# Patient Record
Sex: FEMALE | Race: WHITE | NOT HISPANIC OR LATINO | Employment: UNEMPLOYED | URBAN - METROPOLITAN AREA
[De-identification: names, ages, dates, MRNs, and addresses within clinical notes are randomized per-mention and may not be internally consistent; named-entity substitution may affect disease eponyms.]

---

## 2022-07-06 PROBLEM — N92.6 IRREGULAR MENSES: Status: ACTIVE | Noted: 2022-07-06

## 2022-07-07 DIAGNOSIS — Z76.89 ESTABLISHING CARE WITH NEW DOCTOR, ENCOUNTER FOR: Primary | ICD-10-CM

## 2022-07-07 DIAGNOSIS — I10 HYPERTENSION, UNSPECIFIED TYPE: ICD-10-CM

## 2022-07-08 ENCOUNTER — HOSPITAL ENCOUNTER (OUTPATIENT)
Dept: CARDIOLOGY | Facility: HOSPITAL | Age: 47
Discharge: HOME | End: 2022-07-08
Attending: STUDENT IN AN ORGANIZED HEALTH CARE EDUCATION/TRAINING PROGRAM
Payer: MEDICARE

## 2022-07-08 ENCOUNTER — OFFICE VISIT (OUTPATIENT)
Dept: CARDIOLOGY | Facility: CLINIC | Age: 47
End: 2022-07-08
Payer: MEDICARE

## 2022-07-08 VITALS
WEIGHT: 122.38 LBS | BODY MASS INDEX: 21.67 KG/M2 | OXYGEN SATURATION: 98 % | HEART RATE: 54 BPM | DIASTOLIC BLOOD PRESSURE: 70 MMHG | SYSTOLIC BLOOD PRESSURE: 122 MMHG

## 2022-07-08 DIAGNOSIS — F32.A ANXIETY AND DEPRESSION: Primary | ICD-10-CM

## 2022-07-08 DIAGNOSIS — E87.6 HYPOKALEMIA: ICD-10-CM

## 2022-07-08 DIAGNOSIS — F41.9 ANXIETY AND DEPRESSION: Primary | ICD-10-CM

## 2022-07-08 DIAGNOSIS — I10 HYPERTENSION, UNSPECIFIED TYPE: ICD-10-CM

## 2022-07-08 DIAGNOSIS — Z72.0 TOBACCO ABUSE: ICD-10-CM

## 2022-07-08 DIAGNOSIS — R06.02 SOB (SHORTNESS OF BREATH): ICD-10-CM

## 2022-07-08 DIAGNOSIS — R01.1 CARDIAC MURMUR: ICD-10-CM

## 2022-07-08 DIAGNOSIS — Z76.89 ESTABLISHING CARE WITH NEW DOCTOR, ENCOUNTER FOR: ICD-10-CM

## 2022-07-08 PROCEDURE — 99204 OFFICE O/P NEW MOD 45 MIN: CPT | Mod: S$PBB,,, | Performed by: STUDENT IN AN ORGANIZED HEALTH CARE EDUCATION/TRAINING PROGRAM

## 2022-07-08 PROCEDURE — 93005 ELECTROCARDIOGRAM TRACING: CPT | Mod: PO

## 2022-07-08 PROCEDURE — 93010 ELECTROCARDIOGRAM REPORT: CPT | Mod: ,,, | Performed by: INTERNAL MEDICINE

## 2022-07-08 PROCEDURE — 93010 EKG 12-LEAD: ICD-10-PCS | Mod: ,,, | Performed by: INTERNAL MEDICINE

## 2022-07-08 PROCEDURE — 99999 PR PBB SHADOW E&M-EST. PATIENT-LVL III: CPT | Mod: PBBFAC,,, | Performed by: STUDENT IN AN ORGANIZED HEALTH CARE EDUCATION/TRAINING PROGRAM

## 2022-07-08 PROCEDURE — 99213 OFFICE O/P EST LOW 20 MIN: CPT | Mod: PBBFAC,PO | Performed by: STUDENT IN AN ORGANIZED HEALTH CARE EDUCATION/TRAINING PROGRAM

## 2022-07-08 PROCEDURE — 99204 PR OFFICE/OUTPT VISIT, NEW, LEVL IV, 45-59 MIN: ICD-10-PCS | Mod: S$PBB,,, | Performed by: STUDENT IN AN ORGANIZED HEALTH CARE EDUCATION/TRAINING PROGRAM

## 2022-07-08 PROCEDURE — 99999 PR PBB SHADOW E&M-EST. PATIENT-LVL III: ICD-10-PCS | Mod: PBBFAC,,, | Performed by: STUDENT IN AN ORGANIZED HEALTH CARE EDUCATION/TRAINING PROGRAM

## 2022-07-08 NOTE — PROGRESS NOTES
Section of Cardiology                  Cardiac Clinic Note    Chief Complaint/Reason for consultation:  Hypertension      HPI:   Virginia Hart is a 46 y.o. female with h/o HTN, depression/anxiety who comes in as a referral to cardiology clinic by Dr. Feliciano for evaluation.    2022  Reports high blood pressure at least for the last 4 months   Just started seeing a regular doctor (was seeing someone who was prescribing her xanax)  Checks BP readings at home   Reports SOB, may be associated with anxiety per patient, throughout the day, just started about 3-4 months ago  Reports 1 episode of dizziness, while sitting, was on her menstrual cycle  LE swelling, chronic, goes away on it's own  Exercises with  5 days a week, for 1 hour  Tries to eat healthy, watches salt intake   Reports HR runs low regularly   Smokes <1 ppd, smoked for > 20 years, ETOH occ    Family history: no cardiac issues, mom- CVA,  in    Denies chest pain, PND, orthopnea, syncope   Denies DM, CVA      EKG 2022 SB, no acute ST - T wave changes, qtc 450 ms     ECHO      STRESS TEST    [facility]      ROS: All 10 systems reviewed. Please refer to the HPI for pertinent positives. All other systems negative.     Past Medical History  Past Medical History:   Diagnosis Date    Hypertension        Surgical History  Past Surgical History:   Procedure Laterality Date    breast implants      FOOT SURGERY      NECK SURGERY            Allergies:   Review of patient's allergies indicates:  No Known Allergies    Social History:  Social History     Socioeconomic History    Marital status:    Tobacco Use    Smoking status: Current Every Day Smoker     Types: Cigarettes    Smokeless tobacco: Never Used   Substance and Sexual Activity    Alcohol use: Yes    Drug use: Never    Sexual activity: Yes       Family History:  family history includes Cancer in her maternal grandmother and mother.    Home Medications:  Current  Outpatient Medications on File Prior to Visit   Medication Sig Dispense Refill    ALPRAZolam (XANAX) 2 MG Tab TAKE 3/4 TABLET BY MOUTH EVERY MORNING, 3/4 TABLET BY MOUTH EVERY EVENING and 1 TABLET EVERY NIGHT AT BEDTIME AS NEEDED      nitrofurantoin, macrocrystal-monohydrate, (MACROBID) 100 MG capsule Take 1 capsule (100 mg total) by mouth 2 (two) times daily. for 7 days 14 capsule 0    ergocalciferol (ERGOCALCIFEROL) 50,000 unit Cap Take 50,000 Units by mouth every 7 days.       No current facility-administered medications on file prior to visit.       Physical exam:  /70 (BP Location: Left arm, Patient Position: Sitting, BP Method: Medium (Manual))   Pulse (!) 54   Wt 55.5 kg (122 lb 5.7 oz)   LMP 06/22/2022   SpO2 98%   BMI 21.67 kg/m²         General: Pt is a 46 y.o. year old female who is AAOx3, in NAD, is pleasant, well nourished, looks stated age  HEENT: PERRL, EOMI, Oral mucosa pink & moist  CVS  No abnormal cardiac pulsations noted on inspection. JVP not raised. The apical impulse is normal on palpation, and is located in the left 5th intercostal space in the mid - clavicular line. No palpable thrills or abnormal pulsations noted. RR, S1 - S2 heard, 2/6 systolic murmur at LSB, rubs or gallops appreciated.   PUL : CTA B/L. No wheezes/crackles heard   ABD : BS +, soft. No tenderness elicited   LE : No C/C/E. Distal Pulses palpable B/L         LABS:    Chemistry:   Lab Results   Component Value Date     07/06/2022    K 3.4 (A) 07/06/2022     07/06/2022    CO2 29 07/06/2022    BUN 10 07/06/2022    CREATININE 0.70 07/06/2022    CALCIUM 9.5 07/06/2022     Cardiac Markers: No results found for: CKTOTAL, CKMB, CKMBINDEX, TROPONINI  Cardiac Markers (Last 3): No results found for: CKTOTAL, CKMB, CKMBINDEX, TROPONINI  CBC:   Lab Results   Component Value Date    WBC 8.30 07/06/2022    HGB 14.3 07/06/2022    HCT 43.9 07/06/2022    MCV 91.8 07/06/2022     07/06/2022     Lipids: No  results found for: CHOL, TRIG, HDL, LDLDIRECT  Coagulation: No results found for: PT, INR, APTT        Assessment      1. Anxiety and depression    2. Hypertension, unspecified type    3. Hypokalemia    4. Tobacco abuse    5. SOB (shortness of breath)         Plan:    Shortness of breath   Will obtain echo    Hypertension  Not currently on medications     Hypokalemia  Low most recent labs  Increase potassium intake    Tobacco abuse  Will refer to smoking cessation program     Depression/anxiety  Stable  Currently on xanax         This note was prepared using voice recognition system and is likely to have sound alike errors that may have been overlooked even after proofreading.     I have reviewed all pertinent chart information.  Plans and recommendations have been formulated under my direct supervision. All questions answered and patient voiced understanding.   If symptoms persist go to the ED.    RTC in 2 weeks         Evie Marcelo MD  Cardiology

## 2022-07-19 ENCOUNTER — HOSPITAL ENCOUNTER (OUTPATIENT)
Dept: CARDIOLOGY | Facility: HOSPITAL | Age: 47
Discharge: HOME | End: 2022-07-19
Attending: STUDENT IN AN ORGANIZED HEALTH CARE EDUCATION/TRAINING PROGRAM
Payer: MEDICARE

## 2022-07-19 VITALS
HEIGHT: 63 IN | WEIGHT: 122 LBS | SYSTOLIC BLOOD PRESSURE: 122 MMHG | DIASTOLIC BLOOD PRESSURE: 70 MMHG | BODY MASS INDEX: 21.62 KG/M2

## 2022-07-19 DIAGNOSIS — E87.6 HYPOKALEMIA: ICD-10-CM

## 2022-07-19 DIAGNOSIS — R06.02 SOB (SHORTNESS OF BREATH): ICD-10-CM

## 2022-07-19 DIAGNOSIS — F41.9 ANXIETY AND DEPRESSION: ICD-10-CM

## 2022-07-19 DIAGNOSIS — F32.A ANXIETY AND DEPRESSION: ICD-10-CM

## 2022-07-19 DIAGNOSIS — I10 HYPERTENSION, UNSPECIFIED TYPE: ICD-10-CM

## 2022-07-19 LAB
AORTIC ROOT ANNULUS: 2.67 CM
ASCENDING AORTA: 3.08 CM
AV INDEX (PROSTH): 0.69
AV MEAN GRADIENT: 11 MMHG
AV PEAK GRADIENT: 18 MMHG
AV REGURGITATION PRESSURE HALF TIME: 1421.35 MS
AV VALVE AREA: 2.18 CM2
AV VELOCITY RATIO: 0.62
BSA FOR ECHO PROCEDURE: 1.57 M2
CV ECHO LV RWT: 0.32 CM
DOP CALC AO PEAK VEL: 2.11 M/S
DOP CALC AO VTI: 54 CM
DOP CALC LVOT AREA: 3.1 CM2
DOP CALC LVOT DIAMETER: 2 CM
DOP CALC LVOT PEAK VEL: 1.31 M/S
DOP CALC LVOT STROKE VOLUME: 117.75 CM3
DOP CALC RVOT PEAK VEL: 0.98 M/S
DOP CALC RVOT VTI: 18.6 CM
DOP CALCLVOT PEAK VEL VTI: 37.5 CM
E WAVE DECELERATION TIME: 265.48 MSEC
E/A RATIO: 1.23
E/E' RATIO: 8.87 M/S
ECHO LV POSTERIOR WALL: 0.7 CM (ref 0.6–1.1)
EJECTION FRACTION: 65 %
FRACTIONAL SHORTENING: 30 % (ref 28–44)
INTERVENTRICULAR SEPTUM: 1.16 CM (ref 0.6–1.1)
IVRT: 82.78 MSEC
LA MAJOR: 5.85 CM
LA MINOR: 5.17 CM
LA WIDTH: 3.6 CM
LEFT ATRIUM SIZE: 3.29 CM
LEFT ATRIUM VOLUME INDEX MOD: 28.2 ML/M2
LEFT ATRIUM VOLUME INDEX: 35.2 ML/M2
LEFT ATRIUM VOLUME MOD: 44.3 CM3
LEFT ATRIUM VOLUME: 55.26 CM3
LEFT INTERNAL DIMENSION IN SYSTOLE: 3.11 CM (ref 2.1–4)
LEFT VENTRICLE DIASTOLIC VOLUME INDEX: 56.32 ML/M2
LEFT VENTRICLE DIASTOLIC VOLUME: 88.42 ML
LEFT VENTRICLE MASS INDEX: 86 G/M2
LEFT VENTRICLE SYSTOLIC VOLUME INDEX: 24.4 ML/M2
LEFT VENTRICLE SYSTOLIC VOLUME: 38.32 ML
LEFT VENTRICULAR INTERNAL DIMENSION IN DIASTOLE: 4.42 CM (ref 3.5–6)
LEFT VENTRICULAR MASS: 134.82 G
LV LATERAL E/E' RATIO: 9.27 M/S
LV SEPTAL E/E' RATIO: 8.5 M/S
LVOT MG: 4.03 MMHG
LVOT MV: 0.95 CM/S
MV PEAK A VEL: 0.83 M/S
MV PEAK E VEL: 1.02 M/S
MV STENOSIS PRESSURE HALF TIME: 73.42 MS
MV VALVE AREA P 1/2 METHOD: 3 CM2
PISA AR MAX VEL: 3.42 M/S
PISA TR MAX VEL: 2.19 M/S
PULM VEIN S/D RATIO: 1.57
PV MEAN GRADIENT: 1.99 MMHG
PV MV: 1 M/S
PV PEAK D VEL: 0.45 M/S
PV PEAK S VEL: 0.71 M/S
PV PEAK VELOCITY: 1.36 CM/S
RA MAJOR: 4.14 CM
RA PRESSURE: 3 MMHG
RA WIDTH: 2.74 CM
RIGHT VENTRICULAR END-DIASTOLIC DIMENSION: 2.1 CM
SINUS: 2.52 CM
STJ: 2.77 CM
TDI LATERAL: 0.11 M/S
TDI SEPTAL: 0.12 M/S
TDI: 0.12 M/S
TR MAX PG: 19 MMHG
TRICUSPID ANNULAR PLANE SYSTOLIC EXCURSION: 2.82 CM
TV REST PULMONARY ARTERY PRESSURE: 22 MMHG

## 2022-07-19 PROCEDURE — 93306 ECHO (CUPID ONLY): ICD-10-PCS | Mod: 26,,, | Performed by: STUDENT IN AN ORGANIZED HEALTH CARE EDUCATION/TRAINING PROGRAM

## 2022-07-19 PROCEDURE — 93306 TTE W/DOPPLER COMPLETE: CPT | Mod: 26,,, | Performed by: STUDENT IN AN ORGANIZED HEALTH CARE EDUCATION/TRAINING PROGRAM

## 2022-07-19 PROCEDURE — 93306 TTE W/DOPPLER COMPLETE: CPT | Mod: PO

## 2022-07-22 ENCOUNTER — OFFICE VISIT (OUTPATIENT)
Dept: CARDIOLOGY | Facility: CLINIC | Age: 47
End: 2022-07-22
Payer: MEDICARE

## 2022-07-22 VITALS
BODY MASS INDEX: 21.91 KG/M2 | HEART RATE: 49 BPM | WEIGHT: 123.69 LBS | SYSTOLIC BLOOD PRESSURE: 126 MMHG | DIASTOLIC BLOOD PRESSURE: 82 MMHG | OXYGEN SATURATION: 99 % | HEIGHT: 63 IN

## 2022-07-22 DIAGNOSIS — I10 HYPERTENSION, UNSPECIFIED TYPE: Primary | ICD-10-CM

## 2022-07-22 DIAGNOSIS — E87.6 HYPOKALEMIA: ICD-10-CM

## 2022-07-22 DIAGNOSIS — F41.9 ANXIETY AND DEPRESSION: ICD-10-CM

## 2022-07-22 DIAGNOSIS — R06.02 SOB (SHORTNESS OF BREATH): ICD-10-CM

## 2022-07-22 DIAGNOSIS — Z72.0 TOBACCO ABUSE: ICD-10-CM

## 2022-07-22 DIAGNOSIS — F32.A ANXIETY AND DEPRESSION: ICD-10-CM

## 2022-07-22 DIAGNOSIS — R53.83 FATIGUE, UNSPECIFIED TYPE: ICD-10-CM

## 2022-07-22 PROCEDURE — 99214 OFFICE O/P EST MOD 30 MIN: CPT | Mod: S$PBB,,, | Performed by: STUDENT IN AN ORGANIZED HEALTH CARE EDUCATION/TRAINING PROGRAM

## 2022-07-22 PROCEDURE — 99999 PR PBB SHADOW E&M-EST. PATIENT-LVL III: ICD-10-PCS | Mod: PBBFAC,,, | Performed by: STUDENT IN AN ORGANIZED HEALTH CARE EDUCATION/TRAINING PROGRAM

## 2022-07-22 PROCEDURE — 99213 OFFICE O/P EST LOW 20 MIN: CPT | Mod: PBBFAC,PO | Performed by: STUDENT IN AN ORGANIZED HEALTH CARE EDUCATION/TRAINING PROGRAM

## 2022-07-22 PROCEDURE — 99999 PR PBB SHADOW E&M-EST. PATIENT-LVL III: CPT | Mod: PBBFAC,,, | Performed by: STUDENT IN AN ORGANIZED HEALTH CARE EDUCATION/TRAINING PROGRAM

## 2022-07-22 PROCEDURE — 99214 PR OFFICE/OUTPT VISIT, EST, LEVL IV, 30-39 MIN: ICD-10-PCS | Mod: S$PBB,,, | Performed by: STUDENT IN AN ORGANIZED HEALTH CARE EDUCATION/TRAINING PROGRAM

## 2022-07-22 NOTE — PROGRESS NOTES
Section of Cardiology                  Cardiac Clinic Note    Chief Complaint/Reason for consultation:  Hypertension      HPI:   Virginia Hart is a 46 y.o. female with h/o HTN, depression/anxiety who comes in as a referral to cardiology clinic by Dr. Feliciano for evaluation.    2022  Reports high blood pressure at least for the last 4 months   Just started seeing a regular doctor (was seeing someone who was prescribing her xanax)  Checks BP readings at home   Reports SOB, may be associated with anxiety per patient, throughout the day, just started about 3-4 months ago  Reports 1 episode of dizziness, while sitting, was on her menstrual cycle  LE swelling, chronic, goes away on it's own  Exercises with  5 days a week, for 1 hour  Tries to eat healthy, watches salt intake   Reports HR runs low regularly   Smokes <1 ppd, smoked for > 20 years, ETOH occ    Family history: no cardiac issues, mom- CVA,  in    Denies chest pain, PND, orthopnea, syncope   Denies DM, CVA      22  Reports SOB, stable  Doesn't sleep well  Wakes up in the middle of the night, multiple times, never had sleep study   Feels tired throughout the day, which is unusual for her   Chest pain is infrequent, thought due to anxiety, lasts 1 min    Echo with normal EF     Denies PND, orthopnea, syncope         EKG 2022 SB, no acute ST - T wave changes, qtc 450 ms     ECHO    · Mild left atrial enlargement.  · The estimated PA systolic pressure is 22 mmHg.  · The left ventricle is normal in size with normal systolic function.  · Normal left ventricular diastolic function.  · Normal right ventricular size with normal right ventricular systolic function.  · Normal central venous pressure (3 mmHg).  · Mild aortic regurgitation.  · The estimated ejection fraction is 65%.        STRESS TEST    [facility]      ROS: All 10 systems reviewed. Please refer to the HPI for pertinent positives. All other systems negative.     Past  "Medical History  Past Medical History:   Diagnosis Date    Hypertension        Surgical History  Past Surgical History:   Procedure Laterality Date    breast implants      FOOT SURGERY      NECK SURGERY            Allergies:   Review of patient's allergies indicates:  No Known Allergies    Social History:  Social History     Socioeconomic History    Marital status:    Tobacco Use    Smoking status: Current Every Day Smoker     Types: Cigarettes    Smokeless tobacco: Never Used   Substance and Sexual Activity    Alcohol use: Yes    Drug use: Never    Sexual activity: Yes       Family History:  family history includes Cancer in her maternal grandmother and mother.    Home Medications:  Current Outpatient Medications on File Prior to Visit   Medication Sig Dispense Refill    ALPRAZolam (XANAX) 2 MG Tab TAKE 3/4 TABLET BY MOUTH EVERY MORNING, 3/4 TABLET BY MOUTH EVERY EVENING and 1 TABLET EVERY NIGHT AT BEDTIME AS NEEDED      ergocalciferol (ERGOCALCIFEROL) 50,000 unit Cap Take 50,000 Units by mouth every 7 days.       No current facility-administered medications on file prior to visit.       Physical exam:  /82 (BP Location: Left arm, Patient Position: Sitting, BP Method: Medium (Manual))   Pulse (!) 49   Ht 5' 3" (1.6 m)   Wt 56.1 kg (123 lb 10.9 oz)   LMP 06/22/2022   SpO2 99%   BMI 21.91 kg/m²         General: Pt is a 46 y.o. year old female who is AAOx3, in NAD, is pleasant, well nourished, looks stated age  HEENT: PERRL, EOMI, Oral mucosa pink & moist  CVS  No abnormal cardiac pulsations noted on inspection. JVP not raised. The apical impulse is normal on palpation, and is located in the left 5th intercostal space in the mid - clavicular line. No palpable thrills or abnormal pulsations noted. RR, S1 - S2 heard, 2/6 systolic murmur at LSB, rubs or gallops appreciated.   PUL : CTA B/L. No wheezes/crackles heard   ABD : BS +, soft. No tenderness elicited   LE : No C/C/E. Distal Pulses " palpable B/L         LABS:    Chemistry:   Lab Results   Component Value Date     07/06/2022    K 3.4 (A) 07/06/2022     07/06/2022    CO2 29 07/06/2022    BUN 10 07/06/2022    CREATININE 0.70 07/06/2022    CALCIUM 9.5 07/06/2022     Cardiac Markers: No results found for: CKTOTAL, CKMB, CKMBINDEX, TROPONINI  Cardiac Markers (Last 3): No results found for: CKTOTAL, CKMB, CKMBINDEX, TROPONINI  CBC:   Lab Results   Component Value Date    WBC 8.30 07/06/2022    HGB 14.3 07/06/2022    HCT 43.9 07/06/2022    MCV 91.8 07/06/2022     07/06/2022     Lipids: No results found for: CHOL, TRIG, HDL, LDLDIRECT  Coagulation: No results found for: PT, INR, APTT        Assessment      1. Hypertension, unspecified type    2. Anxiety and depression    3. Hypokalemia    4. SOB (shortness of breath)    5. Fatigue, unspecified type    6. Tobacco abuse         Plan:    Shortness of breath/fatigue  Echo with normal EF  May have COPD, currently smokes   May need sleep study   Refer to Pulmonology     Hypertension  Not currently on medications   Enroll in digital medicine     Hypokalemia  Low most recent labs  Continue to increase potassium intake    Tobacco abuse  Referred smoking cessation program     Depression/anxiety  Stable  Currently on xanax         This note was prepared using voice recognition system and is likely to have sound alike errors that may have been overlooked even after proofreading.     I have reviewed all pertinent chart information.  Plans and recommendations have been formulated under my direct supervision. All questions answered and patient voiced understanding.   If symptoms persist go to the ED.    RTC in 3 months         Evie Marcelo MD  Cardiology

## 2022-07-28 ENCOUNTER — PATIENT MESSAGE (OUTPATIENT)
Dept: ADMINISTRATIVE | Facility: OTHER | Age: 47
End: 2022-07-28
Payer: MEDICAID

## 2022-09-26 DIAGNOSIS — R06.02 SOB (SHORTNESS OF BREATH): Primary | ICD-10-CM

## 2022-10-10 ENCOUNTER — OFFICE VISIT (OUTPATIENT)
Dept: PULMONOLOGY | Facility: CLINIC | Age: 47
End: 2022-10-10
Payer: MEDICARE

## 2022-10-10 ENCOUNTER — HOSPITAL ENCOUNTER (OUTPATIENT)
Dept: RADIOLOGY | Facility: HOSPITAL | Age: 47
Discharge: HOME | End: 2022-10-10
Attending: INTERNAL MEDICINE
Payer: MEDICARE

## 2022-10-10 VITALS
SYSTOLIC BLOOD PRESSURE: 134 MMHG | OXYGEN SATURATION: 99 % | HEART RATE: 61 BPM | BODY MASS INDEX: 21.25 KG/M2 | DIASTOLIC BLOOD PRESSURE: 72 MMHG | HEIGHT: 63 IN | RESPIRATION RATE: 14 BRPM | WEIGHT: 119.94 LBS

## 2022-10-10 DIAGNOSIS — R06.02 SOB (SHORTNESS OF BREATH): Primary | ICD-10-CM

## 2022-10-10 DIAGNOSIS — F17.200 TOBACCO DEPENDENCE: ICD-10-CM

## 2022-10-10 DIAGNOSIS — R53.83 FATIGUE, UNSPECIFIED TYPE: ICD-10-CM

## 2022-10-10 DIAGNOSIS — I10 PRIMARY HYPERTENSION: ICD-10-CM

## 2022-10-10 DIAGNOSIS — R06.02 SOB (SHORTNESS OF BREATH): ICD-10-CM

## 2022-10-10 PROBLEM — F41.9 ANXIETY: Status: ACTIVE | Noted: 2022-10-10

## 2022-10-10 PROCEDURE — 99999 PR PBB SHADOW E&M-EST. PATIENT-LVL V: CPT | Mod: PBBFAC,,, | Performed by: INTERNAL MEDICINE

## 2022-10-10 PROCEDURE — 99205 OFFICE O/P NEW HI 60 MIN: CPT | Mod: S$PBB,,, | Performed by: INTERNAL MEDICINE

## 2022-10-10 PROCEDURE — 71046 X-RAY EXAM CHEST 2 VIEWS: CPT | Mod: TC,FY,PO

## 2022-10-10 PROCEDURE — 99215 OFFICE O/P EST HI 40 MIN: CPT | Mod: PBBFAC,25,PO | Performed by: INTERNAL MEDICINE

## 2022-10-10 PROCEDURE — 71046 XR CHEST PA AND LATERAL: ICD-10-PCS | Mod: 26,,, | Performed by: RADIOLOGY

## 2022-10-10 PROCEDURE — 71046 X-RAY EXAM CHEST 2 VIEWS: CPT | Mod: 26,,, | Performed by: RADIOLOGY

## 2022-10-10 PROCEDURE — 99999 PR PBB SHADOW E&M-EST. PATIENT-LVL V: ICD-10-PCS | Mod: PBBFAC,,, | Performed by: INTERNAL MEDICINE

## 2022-10-10 PROCEDURE — 99205 PR OFFICE/OUTPT VISIT, NEW, LEVL V, 60-74 MIN: ICD-10-PCS | Mod: S$PBB,,, | Performed by: INTERNAL MEDICINE

## 2022-10-10 RX ORDER — TRIAMCINOLONE ACETONIDE 1 MG/G
CREAM TOPICAL 2 TIMES DAILY
COMMUNITY
Start: 2022-08-23

## 2022-10-10 NOTE — ASSESSMENT & PLAN NOTE
Dyspnea multifactorial etiologies   Given smoking history chronic obstructive airway disease is high on the list   Check D-dimer   PFTs  ABG  6 minutes walk
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
## 2022-10-11 ENCOUNTER — OFFICE VISIT (OUTPATIENT)
Dept: CARDIOLOGY | Facility: CLINIC | Age: 47
End: 2022-10-11
Payer: MEDICARE

## 2022-10-11 ENCOUNTER — LAB VISIT (OUTPATIENT)
Dept: LAB | Facility: HOSPITAL | Age: 47
End: 2022-10-11
Attending: STUDENT IN AN ORGANIZED HEALTH CARE EDUCATION/TRAINING PROGRAM
Payer: MEDICARE

## 2022-10-11 VITALS
WEIGHT: 120.13 LBS | HEIGHT: 63 IN | BODY MASS INDEX: 21.29 KG/M2 | HEART RATE: 55 BPM | DIASTOLIC BLOOD PRESSURE: 79 MMHG | SYSTOLIC BLOOD PRESSURE: 133 MMHG | OXYGEN SATURATION: 100 %

## 2022-10-11 DIAGNOSIS — E87.6 HYPOKALEMIA: ICD-10-CM

## 2022-10-11 DIAGNOSIS — I10 PRIMARY HYPERTENSION: ICD-10-CM

## 2022-10-11 DIAGNOSIS — Z72.0 TOBACCO ABUSE: ICD-10-CM

## 2022-10-11 DIAGNOSIS — N92.6 IRREGULAR MENSES: ICD-10-CM

## 2022-10-11 DIAGNOSIS — F32.A ANXIETY AND DEPRESSION: ICD-10-CM

## 2022-10-11 DIAGNOSIS — F41.9 ANXIETY AND DEPRESSION: ICD-10-CM

## 2022-10-11 DIAGNOSIS — R06.02 SOB (SHORTNESS OF BREATH): ICD-10-CM

## 2022-10-11 DIAGNOSIS — R06.02 SOB (SHORTNESS OF BREATH): Primary | ICD-10-CM

## 2022-10-11 PROCEDURE — 36415 COLL VENOUS BLD VENIPUNCTURE: CPT | Mod: PO | Performed by: STUDENT IN AN ORGANIZED HEALTH CARE EDUCATION/TRAINING PROGRAM

## 2022-10-11 PROCEDURE — 99999 PR PBB SHADOW E&M-EST. PATIENT-LVL III: ICD-10-PCS | Mod: PBBFAC,,, | Performed by: STUDENT IN AN ORGANIZED HEALTH CARE EDUCATION/TRAINING PROGRAM

## 2022-10-11 PROCEDURE — 99214 PR OFFICE/OUTPT VISIT, EST, LEVL IV, 30-39 MIN: ICD-10-PCS | Mod: S$PBB,,, | Performed by: STUDENT IN AN ORGANIZED HEALTH CARE EDUCATION/TRAINING PROGRAM

## 2022-10-11 PROCEDURE — 80048 BASIC METABOLIC PNL TOTAL CA: CPT | Performed by: STUDENT IN AN ORGANIZED HEALTH CARE EDUCATION/TRAINING PROGRAM

## 2022-10-11 PROCEDURE — 99213 OFFICE O/P EST LOW 20 MIN: CPT | Mod: PBBFAC,PO | Performed by: STUDENT IN AN ORGANIZED HEALTH CARE EDUCATION/TRAINING PROGRAM

## 2022-10-11 PROCEDURE — 99214 OFFICE O/P EST MOD 30 MIN: CPT | Mod: S$PBB,,, | Performed by: STUDENT IN AN ORGANIZED HEALTH CARE EDUCATION/TRAINING PROGRAM

## 2022-10-11 PROCEDURE — 99999 PR PBB SHADOW E&M-EST. PATIENT-LVL III: CPT | Mod: PBBFAC,,, | Performed by: STUDENT IN AN ORGANIZED HEALTH CARE EDUCATION/TRAINING PROGRAM

## 2022-10-11 RX ORDER — LISINOPRIL 5 MG/1
5 TABLET ORAL DAILY
Qty: 30 TABLET | Refills: 11 | Status: SHIPPED | OUTPATIENT
Start: 2022-10-11 | End: 2023-10-11

## 2022-10-11 NOTE — PROGRESS NOTES
Section of Cardiology                  Cardiac Clinic Note    Chief Complaint/Reason for consultation:  Hypertension      HPI:   Virginia Hart is a 46 y.o. female with h/o HTN, depression/anxiety who comes in as a referral to cardiology clinic by Dr. Feliciano for evaluation.    2022  Reports high blood pressure at least for the last 4 months   Just started seeing a regular doctor (was seeing someone who was prescribing her xanax)  Checks BP readings at home   Reports SOB, may be associated with anxiety per patient, throughout the day, just started about 3-4 months ago  Reports 1 episode of dizziness, while sitting, was on her menstrual cycle  LE swelling, chronic, goes away on it's own  Exercises with  5 days a week, for 1 hour  Tries to eat healthy, watches salt intake   Reports HR runs low regularly   Smokes <1 ppd, smoked for > 20 years, ETOH occ    Family history: no cardiac issues, mom- CVA,  in    Denies chest pain, PND, orthopnea, syncope   Denies DM, CVA      22  Reports SOB, stable  Doesn't sleep well  Wakes up in the middle of the night, multiple times, never had sleep study   Feels tired throughout the day, which is unusual for her   Chest pain is infrequent, thought due to anxiety, lasts 1 min    Echo with normal EF     Denies PND, orthopnea, syncope     10/11/22  Reports being dizzy recently, occurs when she's tired  Digital medicine BP readings are high  Watching salt intake  SOB stable   Denies chest pain          EKG 2022 SB, no acute ST - T wave changes, qtc 450 ms     ECHO    Mild left atrial enlargement.  The estimated PA systolic pressure is 22 mmHg.  The left ventricle is normal in size with normal systolic function.  Normal left ventricular diastolic function.  Normal right ventricular size with normal right ventricular systolic function.  Normal central venous pressure (3 mmHg).  Mild aortic regurgitation.  The estimated ejection fraction is  "65%.        STRESS TEST    [facility]      ROS: All 10 systems reviewed. Please refer to the HPI for pertinent positives. All other systems negative.     Past Medical History  Past Medical History:   Diagnosis Date    Hypertension        Surgical History  Past Surgical History:   Procedure Laterality Date    breast implants      FOOT SURGERY      NECK SURGERY            Allergies:   Review of patient's allergies indicates:  No Known Allergies    Social History:  Social History     Socioeconomic History    Marital status:    Tobacco Use    Smoking status: Every Day     Packs/day: 1.00     Years: 25.00     Pack years: 25.00     Types: Cigarettes     Start date: 1980    Smokeless tobacco: Never   Substance and Sexual Activity    Alcohol use: Yes    Drug use: Never    Sexual activity: Yes       Family History:  family history includes Cancer in her maternal grandmother and mother.    Home Medications:  Current Outpatient Medications on File Prior to Visit   Medication Sig Dispense Refill    ALPRAZolam (XANAX) 2 MG Tab TAKE 3/4 TABLET BY MOUTH EVERY MORNING, 3/4 TABLET BY MOUTH EVERY EVENING and 1 TABLET EVERY NIGHT AT BEDTIME AS NEEDED      triamcinolone acetonide 0.1% (KENALOG) 0.1 % cream Apply topically 2 (two) times daily.       No current facility-administered medications on file prior to visit.       Physical exam:  /79 (BP Location: Right arm, Patient Position: Sitting, BP Method: Medium (Automatic))   Pulse (!) 55   Ht 5' 3" (1.6 m)   Wt 54.5 kg (120 lb 2.4 oz)   SpO2 100%   BMI 21.28 kg/m²         General: Pt is a 46 y.o. year old female who is AAOx3, in NAD, is pleasant, well nourished, looks stated age  HEENT: PERRL, EOMI, Oral mucosa pink & moist  CVS  No abnormal cardiac pulsations noted on inspection. JVP not raised. The apical impulse is normal on palpation, and is located in the left 5th intercostal space in the mid - clavicular line. No palpable thrills or abnormal pulsations noted. RR, S1 - " S2 heard, 2/6 systolic murmur at LSB, rubs or gallops appreciated.   PUL : CTA B/L. No wheezes/crackles heard   ABD : BS +, soft. No tenderness elicited   LE : No C/C/E. Distal Pulses palpable B/L         LABS:    Chemistry:   Lab Results   Component Value Date     07/06/2022    K 3.4 (A) 07/06/2022     07/06/2022    CO2 29 07/06/2022    BUN 10 07/06/2022    CREATININE 0.70 07/06/2022    CALCIUM 9.5 07/06/2022     Cardiac Markers: No results found for: CKTOTAL, CKMB, CKMBINDEX, TROPONINI  Cardiac Markers (Last 3): No results found for: CKTOTAL, CKMB, CKMBINDEX, TROPONINI  CBC:   Lab Results   Component Value Date    WBC 8.30 07/06/2022    HGB 14.3 07/06/2022    HCT 43.9 07/06/2022    MCV 91.8 07/06/2022     07/06/2022     Lipids: No results found for: CHOL, TRIG, HDL, LDLDIRECT  Coagulation: No results found for: PT, INR, APTT        Assessment      1. SOB (shortness of breath)    2. Primary hypertension    3. Irregular menses    4. Anxiety and depression    5. Hypokalemia           Plan:    Shortness of breath/fatigue  Echo with normal EF  May have COPD, currently smokes   Sleep study - pending   Sees Pulmonology     Hypertension  On digital medicine   Start lisinopril     Hypokalemia  Repeat BMP     Tobacco abuse  smoking cessation program- pending      Depression/anxiety  Stable  Currently on xanax         This note was prepared using voice recognition system and is likely to have sound alike errors that may have been overlooked even after proofreading.     I have reviewed all pertinent chart information.  Plans and recommendations have been formulated under my direct supervision. All questions answered and patient voiced understanding.   If symptoms persist go to the ED.    RTC in 1 month        Evie Marcelo MD  Cardiology               medium/red

## 2022-10-12 LAB
ANION GAP SERPL CALC-SCNC: 7 MMOL/L (ref 8–16)
BUN SERPL-MCNC: 7 MG/DL (ref 6–20)
CALCIUM SERPL-MCNC: 9.2 MG/DL (ref 8.7–10.5)
CHLORIDE SERPL-SCNC: 107 MMOL/L (ref 95–110)
CO2 SERPL-SCNC: 27 MMOL/L (ref 23–29)
CREAT SERPL-MCNC: 0.8 MG/DL (ref 0.5–1.4)
EST. GFR  (NO RACE VARIABLE): >60 ML/MIN/1.73 M^2
GLUCOSE SERPL-MCNC: 73 MG/DL (ref 70–110)
POTASSIUM SERPL-SCNC: 3.6 MMOL/L (ref 3.5–5.1)
SODIUM SERPL-SCNC: 141 MMOL/L (ref 136–145)

## 2022-11-07 ENCOUNTER — TELEPHONE (OUTPATIENT)
Dept: SMOKING CESSATION | Facility: CLINIC | Age: 47
End: 2022-11-07
Payer: MEDICAID

## 2022-11-07 ENCOUNTER — CLINICAL SUPPORT (OUTPATIENT)
Dept: SMOKING CESSATION | Facility: CLINIC | Age: 47
End: 2022-11-07

## 2022-11-07 ENCOUNTER — PATIENT MESSAGE (OUTPATIENT)
Dept: SMOKING CESSATION | Facility: CLINIC | Age: 47
End: 2022-11-07
Payer: MEDICAID

## 2022-11-07 DIAGNOSIS — F17.200 NICOTINE DEPENDENCE: Primary | ICD-10-CM

## 2022-11-07 PROCEDURE — 99404 PREV MED CNSL INDIV APPRX 60: CPT | Mod: S$GLB,,, | Performed by: SPEECH-LANGUAGE PATHOLOGIST

## 2022-11-07 PROCEDURE — 99999 PR PBB SHADOW E&M-EST. PATIENT-LVL II: ICD-10-PCS | Mod: PBBFAC,,, | Performed by: SPEECH-LANGUAGE PATHOLOGIST

## 2022-11-07 PROCEDURE — 99999 PR PBB SHADOW E&M-EST. PATIENT-LVL II: CPT | Mod: PBBFAC,,, | Performed by: SPEECH-LANGUAGE PATHOLOGIST

## 2022-11-07 PROCEDURE — 99404 PR PREVENT COUNSEL,INDIV,60 MIN: ICD-10-PCS | Mod: S$GLB,,, | Performed by: SPEECH-LANGUAGE PATHOLOGIST

## 2022-11-07 RX ORDER — IBUPROFEN 200 MG
1 TABLET ORAL DAILY
Qty: 28 PATCH | Refills: 0 | Status: SHIPPED | OUTPATIENT
Start: 2022-11-07 | End: 2022-12-27

## 2022-11-07 RX ORDER — ASPIRIN/CALCIUM CARB/MAGNESIUM 325 MG
4 TABLET ORAL
Qty: 144 LOZENGE | Refills: 0 | Status: SHIPPED | OUTPATIENT
Start: 2022-11-07 | End: 2022-12-27

## 2022-11-07 RX ORDER — BUPROPION HYDROCHLORIDE 150 MG/1
TABLET, EXTENDED RELEASE ORAL
Qty: 60 TABLET | Refills: 0 | Status: SHIPPED | OUTPATIENT
Start: 2022-11-07 | End: 2022-12-06 | Stop reason: SDUPTHER

## 2022-11-07 NOTE — PROGRESS NOTES
At [facility], patient reports smoking 20 cpd. Assessed CO with patient reporting smoking approximately 6 cigarettes. CO 31. Discussed the role of tobacco cessation program, role of NRT & behavioral changes to assist the patient to reach her goal of being tobacco free. The patient reports she is willing to utilize 21 mg patches, Welbutrin & 4 mg lozenges & will return for a follow up visit.  Education & instruction on the role of the NRT, usage & proper placement of the patch, dosing & lozenge technique. The patient verbalized understanding & willingness to apply. Patient instructed to call CTTS anytime. Patient states her goal is to be at 10 cpd or less.  Follow up visit set with the patient for 11/29/2022 at 3:00 pm & for group therapy 11/9/2022 at 6:00 pm     Pulses equal bilaterally, no edema present.

## 2022-11-07 NOTE — TELEPHONE ENCOUNTER
Call to confirm 10:00 am appointment today. Patient requests a text & email with directions to the clinic

## 2022-11-29 ENCOUNTER — CLINICAL SUPPORT (OUTPATIENT)
Dept: SMOKING CESSATION | Facility: CLINIC | Age: 47
End: 2022-11-29
Payer: COMMERCIAL

## 2022-11-29 DIAGNOSIS — F17.200 NICOTINE DEPENDENCE: Primary | ICD-10-CM

## 2022-11-29 PROCEDURE — 99999 PR PBB SHADOW E&M-EST. PATIENT-LVL II: CPT | Mod: PBBFAC,,, | Performed by: SPEECH-LANGUAGE PATHOLOGIST

## 2022-11-29 PROCEDURE — 99404 PREV MED CNSL INDIV APPRX 60: CPT | Mod: S$PBB,,, | Performed by: SPEECH-LANGUAGE PATHOLOGIST

## 2022-11-29 PROCEDURE — 99404 PR PREVENT COUNSEL,INDIV,60 MIN: ICD-10-PCS | Mod: S$PBB,,, | Performed by: SPEECH-LANGUAGE PATHOLOGIST

## 2022-11-29 PROCEDURE — 99999 PR PBB SHADOW E&M-EST. PATIENT-LVL II: ICD-10-PCS | Mod: PBBFAC,,, | Performed by: SPEECH-LANGUAGE PATHOLOGIST

## 2022-11-29 NOTE — PROGRESS NOTES
Individual Follow-Up Form    11/29/2022    Quit Date: 11/11/2022    Clinical Status of Patient: Outpatient    Continuing Medication: yes  Patches 21mg, Welbutrin, 4 mg lozenge    Other Medications: gave 2mg lozenge samples      Target Symptoms: Withdrawal and medication side effects. The following were  rated moderate (3) to severe (4) on TCRS:  Moderate (3): restless, back pain (due to having a titanium stan in the back), ringing in ears  Severe (4): insomnia, increased appetite, unusual/vivid dreams, dry mouth, fatigue/weakness, constipation    Comments: Patient seen in clinic. She reports she quit smoking 11/11/2022; but on 11/14/2022 she bought a pack of cigarettes & has smoked 1 cigarette every 2-3 days. Patient reports she feels happy with her progress & that she enjoys not having the stress of worrying about how she will buy cigarettes. Congratulated patient on her gains & accomplishments.  Patient remains on her NRT of 21 mg patches, Welbutrin 150 mg twice daily & 4 mg lozenges. Administered TCRS with patient reporting moderate symptoms of restless, back pain (due to having a titanium stan in the back), ringing in ears & severe symptoms of insomnia, increased appetite, unusual/vivid dreams, dry mouth, fatigue/weakness, constipation. Provided patient with a copy of TCRS to take home to utilize daily. She reports she had to buy her patches & lozenges over the counter. Education on s/s of nicotine withdrawal as the patient has gone 3 days without smoking & impact of smoking resulting in having to withdraw again. Instruction on slips vs relapse which the patient has not returned to smoking 20 cpd as she was at SOC. Patient reports smoking 1/2 of a cigarette prior to session. CO 8. Discussed strategies such as using lozenges, gum/candy, music, self talk & writing to assist patient with getting through the next few days to remain quit. Patient states she is done with smoking & is willing to not smoke after today.  She reports the lozenges have been very helpful & will utilize them. Patient states her goal is to get down off of the patch mg & lozenge mg. Discussed with patient the role of NRT & concerns of decreasing too soon with risk of relapse & patient agrees. Patient would like to try 2mg lozenge & CTTS provided patient with 2mg lozenge samples. Patient states she will report to CTTS. Patient's goal is to remain smoke free & use her 2 mg lozenges by next session. Follow up set for 12/6/2022 at 8:00 am.     Diagnosis: F17.200    Next Visit: 1 week

## 2022-12-01 ENCOUNTER — CLINICAL SUPPORT (OUTPATIENT)
Dept: PULMONOLOGY | Facility: CLINIC | Age: 47
End: 2022-12-01
Payer: MEDICARE

## 2022-12-01 DIAGNOSIS — R53.83 FATIGUE, UNSPECIFIED TYPE: ICD-10-CM

## 2022-12-01 DIAGNOSIS — R06.02 SOB (SHORTNESS OF BREATH): ICD-10-CM

## 2022-12-01 PROCEDURE — 94762 N-INVAS EAR/PLS OXIMTRY CONT: CPT | Mod: PBBFAC,PO

## 2022-12-01 PROCEDURE — 99999 PR PBB SHADOW E&M-EST. PATIENT-LVL I: ICD-10-PCS | Mod: PBBFAC,,,

## 2022-12-01 PROCEDURE — 99999 PR PBB SHADOW E&M-EST. PATIENT-LVL I: CPT | Mod: PBBFAC,,,

## 2022-12-01 PROCEDURE — 99211 OFF/OP EST MAY X REQ PHY/QHP: CPT | Mod: PBBFAC,PO

## 2022-12-05 ENCOUNTER — CLINICAL SUPPORT (OUTPATIENT)
Dept: PULMONOLOGY | Facility: CLINIC | Age: 47
End: 2022-12-05
Payer: MEDICARE

## 2022-12-05 ENCOUNTER — OFFICE VISIT (OUTPATIENT)
Dept: PULMONOLOGY | Facility: CLINIC | Age: 47
End: 2022-12-05
Payer: MEDICARE

## 2022-12-05 VITALS
BODY MASS INDEX: 22.3 KG/M2 | HEART RATE: 61 BPM | RESPIRATION RATE: 18 BRPM | SYSTOLIC BLOOD PRESSURE: 113 MMHG | DIASTOLIC BLOOD PRESSURE: 72 MMHG | WEIGHT: 125.88 LBS | HEIGHT: 63 IN | OXYGEN SATURATION: 97 %

## 2022-12-05 VITALS — HEIGHT: 63 IN | WEIGHT: 125.88 LBS | BODY MASS INDEX: 22.3 KG/M2

## 2022-12-05 DIAGNOSIS — G47.33 OSA (OBSTRUCTIVE SLEEP APNEA): ICD-10-CM

## 2022-12-05 DIAGNOSIS — R06.02 SOB (SHORTNESS OF BREATH): ICD-10-CM

## 2022-12-05 DIAGNOSIS — I10 PRIMARY HYPERTENSION: ICD-10-CM

## 2022-12-05 DIAGNOSIS — F17.210 CIGARETTE NICOTINE DEPENDENCE WITHOUT COMPLICATION: ICD-10-CM

## 2022-12-05 DIAGNOSIS — R06.02 SOB (SHORTNESS OF BREATH): Primary | ICD-10-CM

## 2022-12-05 LAB
ALLENS TEST: ABNORMAL
BRPFT: ABNORMAL
DELSYS: ABNORMAL
DLCO SINGLE BREATH LLN: 18
DLCO SINGLE BREATH PRE REF: 70.5 %
DLCO SINGLE BREATH REF: 23.73
DLCOC SBVA LLN: 3.39
DLCOC SBVA REF: 4.97
DLCOC SINGLE BREATH LLN: 18
DLCOC SINGLE BREATH REF: 23.73
DLCOVA LLN: 3.39
DLCOVA PRE REF: 91.7 %
DLCOVA PRE: 4.56 ML/(MIN*MMHG*L) (ref 3.39–6.56)
DLCOVA REF: 4.97
ERV LLN: -16449.05
ERV PRE REF: 138.9 %
ERV REF: 0.95
FEF 25 75 CHG: 9.1 %
FEF 25 75 LLN: 1.54
FEF 25 75 POST REF: 81.8 %
FEF 25 75 PRE REF: 74.9 %
FEF 25 75 REF: 2.72
FET100 CHG: 0.8 %
FEV1 CHG: -0.8 %
FEV1 FVC CHG: 1.7 %
FEV1 FVC LLN: 70
FEV1 FVC POST REF: 101.7 %
FEV1 FVC PRE REF: 100 %
FEV1 FVC REF: 81
FEV1 LLN: 2.11
FEV1 POST REF: 81.6 %
FEV1 PRE REF: 82.2 %
FEV1 REF: 2.7
FIO2: 0.21
FRCPLETH LLN: 1.81
FRCPLETH PREREF: 105.9 %
FRCPLETH REF: 2.63
FVC CHG: -2.4 %
FVC LLN: 2.63
FVC POST REF: 79.8 %
FVC PRE REF: 81.8 %
FVC REF: 3.36
HCO3 UR-SCNC: 26.7 MMOL/L (ref 24–28)
IVC PRE: 2.57 L (ref 2.63–4.11)
IVC SINGLE BREATH LLN: 2.63
IVC SINGLE BREATH PRE REF: 76.6 %
IVC SINGLE BREATH REF: 3.36
MODE: ABNORMAL
MVV LLN: 85
MVV PRE REF: 89.7 %
MVV REF: 100
PCO2 BLDA: 45.4 MMHG (ref 35–45)
PEF CHG: 5.5 %
PEF LLN: 4.96
PEF POST REF: 99 %
PEF PRE REF: 93.9 %
PEF REF: 6.62
PH SMN: 7.38 [PH] (ref 7.35–7.45)
PO2 BLDA: 92 MMHG (ref 80–100)
POC BE: 2 MMOL/L
POC SATURATED O2: 97 % (ref 95–100)
POST FEF 25 75: 2.22 L/S (ref 1.54–4.2)
POST FET 100: 6.22 SEC
POST FEV1 FVC: 82.21 % (ref 69.7–90.15)
POST FEV1: 2.2 L (ref 2.11–3.27)
POST FVC: 2.68 L (ref 2.63–4.11)
POST PEF: 6.55 L/S (ref 4.96–8.27)
PRE DLCO: 16.73 ML/(MIN*MMHG) (ref 18–29.47)
PRE ERV: 1.32 L (ref -16449.05–16450.95)
PRE FEF 25 75: 2.04 L/S (ref 1.54–4.2)
PRE FET 100: 6.18 SEC
PRE FEV1 FVC: 80.82 % (ref 69.7–90.15)
PRE FEV1: 2.22 L (ref 2.11–3.27)
PRE FRC PL: 2.79 L (ref 1.81–3.46)
PRE FVC: 2.75 L (ref 2.63–4.11)
PRE MVV: 89.59 L/MIN (ref 84.86–114.81)
PRE PEF: 6.21 L/S (ref 4.96–8.27)
PRE RV: 1.47 L (ref 1.1–2.26)
PRE TLC: 4.21 L (ref 3.78–5.76)
RAW LLN: 3.06
RAW PRE REF: 127.6 %
RAW PRE: 3.9 CMH2O*S/L (ref 3.06–3.06)
RAW REF: 3.06
RV LLN: 1.1
RV PRE REF: 87.3 %
RV REF: 1.68
RVTLC LLN: 26
RVTLC PRE REF: 97.8 %
RVTLC PRE: 34.82 % (ref 26.03–45.21)
RVTLC REF: 36
SAMPLE: ABNORMAL
SITE: ABNORMAL
TLC LLN: 3.78
TLC PRE REF: 88.3 %
TLC REF: 4.77
VA PRE: 3.67 L (ref 4.62–4.62)
VA SINGLE BREATH LLN: 4.62
VA SINGLE BREATH PRE REF: 79.3 %
VA SINGLE BREATH REF: 4.62
VC LLN: 2.63
VC PRE REF: 81.8 %
VC PRE: 2.75 L (ref 2.63–4.11)
VC REF: 3.36

## 2022-12-05 PROCEDURE — 99214 OFFICE O/P EST MOD 30 MIN: CPT | Mod: PBBFAC,27,PO | Performed by: INTERNAL MEDICINE

## 2022-12-05 PROCEDURE — 94729 DIFFUSING CAPACITY: CPT | Mod: 26,S$PBB,, | Performed by: INTERNAL MEDICINE

## 2022-12-05 PROCEDURE — 99999 PR PBB SHADOW E&M-EST. PATIENT-LVL I: CPT | Mod: PBBFAC,,,

## 2022-12-05 PROCEDURE — 94618 PULMONARY STRESS TESTING: ICD-10-PCS | Mod: 26,S$PBB,, | Performed by: INTERNAL MEDICINE

## 2022-12-05 PROCEDURE — 99999 PR PBB SHADOW E&M-EST. PATIENT-LVL I: ICD-10-PCS | Mod: PBBFAC,,,

## 2022-12-05 PROCEDURE — 36600 PR WITHDRAWAL OF ARTERIAL BLOOD: ICD-10-PCS | Mod: S$PBB,,, | Performed by: INTERNAL MEDICINE

## 2022-12-05 PROCEDURE — 36600 WITHDRAWAL OF ARTERIAL BLOOD: CPT | Mod: S$PBB,,, | Performed by: INTERNAL MEDICINE

## 2022-12-05 PROCEDURE — 94729 PR C02/MEMBANE DIFFUSE CAPACITY: ICD-10-PCS | Mod: 26,S$PBB,, | Performed by: INTERNAL MEDICINE

## 2022-12-05 PROCEDURE — 94729 DIFFUSING CAPACITY: CPT | Mod: PBBFAC,PO

## 2022-12-05 PROCEDURE — 99211 OFF/OP EST MAY X REQ PHY/QHP: CPT | Mod: PBBFAC,PO,25

## 2022-12-05 PROCEDURE — 94726 PULM FUNCT TST PLETHYSMOGRAP: ICD-10-PCS | Mod: 26,S$PBB,, | Performed by: INTERNAL MEDICINE

## 2022-12-05 PROCEDURE — 94726 PLETHYSMOGRAPHY LUNG VOLUMES: CPT | Mod: 26,S$PBB,, | Performed by: INTERNAL MEDICINE

## 2022-12-05 PROCEDURE — 99214 PR OFFICE/OUTPT VISIT, EST, LEVL IV, 30-39 MIN: ICD-10-PCS | Mod: 25,S$PBB,, | Performed by: INTERNAL MEDICINE

## 2022-12-05 PROCEDURE — 99214 OFFICE O/P EST MOD 30 MIN: CPT | Mod: 25,S$PBB,, | Performed by: INTERNAL MEDICINE

## 2022-12-05 PROCEDURE — 99999 PR PBB SHADOW E&M-EST. PATIENT-LVL IV: CPT | Mod: PBBFAC,,, | Performed by: INTERNAL MEDICINE

## 2022-12-05 PROCEDURE — 94060 PR EVAL OF BRONCHOSPASM: ICD-10-PCS | Mod: 26,S$PBB,, | Performed by: INTERNAL MEDICINE

## 2022-12-05 PROCEDURE — 99999 PR PBB SHADOW E&M-EST. PATIENT-LVL IV: ICD-10-PCS | Mod: PBBFAC,,, | Performed by: INTERNAL MEDICINE

## 2022-12-05 PROCEDURE — 94726 PLETHYSMOGRAPHY LUNG VOLUMES: CPT | Mod: PBBFAC,PO

## 2022-12-05 PROCEDURE — 36600 WITHDRAWAL OF ARTERIAL BLOOD: CPT | Mod: PBBFAC,PO

## 2022-12-05 PROCEDURE — 94060 EVALUATION OF WHEEZING: CPT | Mod: 26,S$PBB,, | Performed by: INTERNAL MEDICINE

## 2022-12-05 PROCEDURE — 82803 BLOOD GASES ANY COMBINATION: CPT | Mod: PBBFAC,PO

## 2022-12-05 PROCEDURE — 94060 EVALUATION OF WHEEZING: CPT | Mod: PBBFAC,PO

## 2022-12-05 PROCEDURE — 94618 PULMONARY STRESS TESTING: CPT | Mod: 26,S$PBB,, | Performed by: INTERNAL MEDICINE

## 2022-12-05 PROCEDURE — 94618 PULMONARY STRESS TESTING: CPT | Mod: PBBFAC,PO

## 2022-12-05 RX ORDER — CYPROHEPTADINE HYDROCHLORIDE 4 MG/1
4 TABLET ORAL
COMMUNITY
Start: 2022-11-15 | End: 2023-07-21

## 2022-12-05 NOTE — PROCEDURES
[facility]  [address] * [place] [address]  Telephone: [phone]  Test date: 22 Start: 22 00:13:41 Virginia Hart  Doctor: Dr. Dave End: 22 08:01:17 [id number]  Oximetry: Summary Report  Comments: ra  Recording time: 07:47:36 Highest pulse: 78 Highest SpO2: 99%  Excluded samplin:13:20 Lowest pulse: 44 Lowest SpO2: 84%  Total valid samplin:34:16 Mean pulse: 56 Mean SpO2: 96.8%  1 S.D.: 4.1 1 S.D.: 1.0  Time with SpO2<90: 0:00:04, 0.0%  Time with SpO2<80: 0:00:00, 0.0%  Time with SpO2<70: 0:00:00, 0.0%  Time with SpO2<60: 0:00:00, 0.0%  Time with SpO2<89: 0:00:04, 0.0%  Time with SpO2 =>90: 7:34:12, 100.0%  Time with SpO2=>80 & <90: 0:00:04, 0.0%  Time with SpO2=>70 & <80: 0:00:00, 0.0%  Time with SpO2=>60 & <70: 0:00:00, 0.0%  The longest continuous time with saturation <=88 was 00:00:04, which started at  22 02:08:49.  A desaturation event was defined as a decrease of saturation by 4 or more.  No events were excluded due to artifact.  There was one desaturation event over 3 minutes duration.  There were 8 desaturation events of less than 3 minutes duration during which:  The mean high was 97.6%. The mean low was 92.6%.  The number of these events that were:  > 0 & <10 seconds: 1 > 0 seconds: 8  =>10 & <20 seconds: 0 =>10 seconds: 7  =>20 & <30 seconds: 1 =>20 seconds: 7  =>30 & <40 seconds: 0 =>30 seconds: 6  =>40 & <50 seconds: 2 =>40 seconds: 6  =>50 & <60 seconds: 2 =>50 seconds: 4  =>60 seconds: 2 =>60 seconds: 2  The mean length of desaturation events that were >=10 sec & <=3 mins was: 56.6 sec.  Desaturation event index (events >=10 sec per sampled hour): 0.9  Desaturation event index (events >= 0 sec per sampled hour): 1.1   [employer] Oximetry version Standard [id number].  Oximeter: Nonin 2500 PalmSAT memory, 4 second resolution.      OVERNIGHT OXIMETRY REPORT:    Dictated by: Trell Dave MD  Test date:  2022  Dictated on: 2022      Comment: This test was performed on Room Air     A desaturation event was defined as a decrease of saturation by 4 or more.    REPORT SUMMARY  Total valid samplin:34:16   High SpO2: 99%    Low SpO2: 84%    Mean SpO2  96.8 %  Cumulative time with oxygen saturation less than 88% (TC88): 0:00:04    CLINICAL INTERPRETATION  Results are normal,  Clinical correlation is advised, and  Recommend overnight polysomnography if clinically indicated    Medicare Criteria Comments:   Oximetry test results suggest the patient does not fall under Medicare Group 1 Criteria. ( Arterial oxygen saturation at or below 88% for at least 5 minutes taken during sleep)  Trell Dave MD    Details about Medicare Group Criteria coverage can be found at http://www.cms.hhs.gov/manuals/downloads/

## 2022-12-05 NOTE — ASSESSMENT & PLAN NOTE
Dyspnea multifactorial etiologies   Given smoking history chronic obstructive airway disease is high on the list   Check D-dimer -ve  PFTs: Normal Spirometry, Normal MVV, Normal TLC, Normal DLCO corrected   ABG: Normal  6 minutes walk: Normal capacity

## 2022-12-05 NOTE — PROCEDURES
"[facility] - Pulmonary Function  Six Minute Walk     SUMMARY     Ordering Provider: Dr. Dave   Interpreting Provider: Dr. Dave  Performing nurse/tech/RT: Yolis CRT  Diagnosis: Shortness of Breath  Height: 5' 3" (160 cm)  Weight: 57.1 kg (125 lb 14.1 oz)  BMI (Calculated): 22.3   Patient Race:             Phase Oxygen Assessment Supplemental O2 Heart   Rate Blood Pressure Bonita Dyspnea Scale Rating   Resting 97 % Room Air 61 bpm 113/70 0   Exercise        Minute        1 100 % Room Air 74 bpm     2 97 % Room Air 83 bpm     3 99 % Room Air 82 bpm     4 98 % Room Air 81 bpm     5 98 % Room Air 82 bpm     6  100 % Room Air 82 bpm 132/65 2   Recovery        Minute        1 100 % Room Air 73 bpm     2 100 % Room Air 63 bpm     3 100 % Room Air 63 bpm     4 100 % Room Air 59 bpm 113/72 0     Six Minute Walk Summary  6MWT Status: completed without stopping  Patient Reported: Dyspnea, Lightheadedness     Interpretation:  Did the patient stop or pause?: No         Total Time Walked (Calculated): 360 seconds  Final Partial Lap Distance (feet): 0 feet  Total Distance Meters (Calculated): 548.64 meters  Predicted Distance Meters (Calculated): 590.62 meters  Percentage of Predicted (Calculated): 92.89  Peak VO2 (Calculated): 20.44  Mets: 5.84  Has The Patient Had a Previous Six Minute Walk Test?: No       Previous 6MWT Results  Has The Patient Had a Previous Six Minute Walk Test?: No           CLINICAL INTERPRETATION:  Six minute walk distance is 548.64m (92.89 % predicted) with very light dyspnea.  During exercise, there was no significant desaturation while breathing room air.  Blood pressure remained stable and Heart rate remained stable with walking.  The patient reported non-pulmonary symptoms during exercise.  The patient did complete the study, walking 360 seconds of the 360 second test.  Based upon age and body mass index, exercise capacity is normal.      [] Mild exercise-induced hypoxemia described " as an arterial oxygen saturation of 93-95% (or 3-4% less than at rest)  []  Moderate exercise-induced hypoxemia as 89-93%  []  Severe exercise induced hypoxemia as < 89% O2 saturation.  Medicare Criteria for oxygen prescription comments:   []  When arterial oxygen saturation is at or below 88% during exercise (severe exercise induced hypoxemia) then the patient falls under Medicare Group 1 criteria for supplemental oxygen
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
## 2022-12-06 ENCOUNTER — TELEPHONE (OUTPATIENT)
Dept: SMOKING CESSATION | Facility: CLINIC | Age: 47
End: 2022-12-06
Payer: MEDICAID

## 2022-12-06 ENCOUNTER — CLINICAL SUPPORT (OUTPATIENT)
Dept: SMOKING CESSATION | Facility: CLINIC | Age: 47
End: 2022-12-06
Payer: COMMERCIAL

## 2022-12-06 DIAGNOSIS — F17.200 NICOTINE DEPENDENCE: Primary | ICD-10-CM

## 2022-12-06 PROCEDURE — 99999 PR PBB SHADOW E&M-EST. PATIENT-LVL I: ICD-10-PCS | Mod: PBBFAC,,, | Performed by: SPEECH-LANGUAGE PATHOLOGIST

## 2022-12-06 PROCEDURE — 99403 PREV MED CNSL INDIV APPRX 45: CPT | Mod: S$GLB,,, | Performed by: SPEECH-LANGUAGE PATHOLOGIST

## 2022-12-06 PROCEDURE — 99403 PR PREVENT COUNSEL,INDIV,45 MIN: ICD-10-PCS | Mod: S$GLB,,, | Performed by: SPEECH-LANGUAGE PATHOLOGIST

## 2022-12-06 PROCEDURE — 99999 PR PBB SHADOW E&M-EST. PATIENT-LVL I: CPT | Mod: PBBFAC,,, | Performed by: SPEECH-LANGUAGE PATHOLOGIST

## 2022-12-06 RX ORDER — BUPROPION HYDROCHLORIDE 150 MG/1
TABLET, EXTENDED RELEASE ORAL
Qty: 60 TABLET | Refills: 0 | Status: SHIPPED | OUTPATIENT
Start: 2022-12-06 | End: 2023-01-23 | Stop reason: SDUPTHER

## 2022-12-06 NOTE — PROGRESS NOTES
Individual Follow-Up Form    12/6/2022    Quit Date: 11/11/2022    Clinical Status of Patient: Outpatient    Continuing Medication: yes  Patches 21mg; Wellbutrin, 4 mg lozenges, has samples of 2 mg lozenges    Other Medications:      Target Symptoms: Withdrawal and medication side effects. The following were  rated moderate (3) to severe (4) on TCRS:  Moderate (3): desire/crave; insomnia which patient stated has been chronic; restless; dry mouth  Severe (4): increased appetite, unusual/vivid dreams    Comments: Telephone visit due to the patient can't make it in. Patient reports she has remained smoke free since her quit date of 11/11/2022. Praised the patient on her continued gains & commitment. Administered TCRS with moderate symptoms of desire/crave; insomnia which patient stated has been chronic; restless; dry mouth & severe symptoms of increased appetite & unusual/vivid dreams. Patient reports she is experiencing some knee pain at night which she wasn't sure was related to Wellbutrin. Patient reports she doesn't want to stop taking it. Discussed removing the patch at night to see if this improves patient's sleep. Patient states she had an episode of urge/crave when she was running late to the Dr; however she used her lozenges. Patient remains on her NRT of 21 mg patches, Wellbutrin & 4 mg lozenges. She reports using approximately 10- 4 mg lozenges or less per day. Discussed using breathing & exercise to assist with stress as well as explored healthy snack options due to patient reporting she has increased consumption of chips & candy since her quit. Patient reports her goal by next session is to remain smoke free & to be utilizing her 2 mg lozenges. Follow up set for 12/27/2022 at 11:00 am    Diagnosis: F17.200    Next Visit: 3 weeks

## 2022-12-07 ENCOUNTER — HOSPITAL ENCOUNTER (OUTPATIENT)
Dept: SLEEP MEDICINE | Facility: HOSPITAL | Age: 47
Discharge: HOME | End: 2022-12-07
Attending: INTERNAL MEDICINE
Payer: MEDICARE

## 2022-12-07 DIAGNOSIS — G47.33 OSA (OBSTRUCTIVE SLEEP APNEA): ICD-10-CM

## 2022-12-07 PROCEDURE — 95810 POLYSOM 6/> YRS 4/> PARAM: CPT

## 2022-12-07 NOTE — Clinical Note
CONCLUSION: 1. Mild obstructive sleep apnea; AHI 5.8/hr with 38 events, REM AHI was 17.1/hr 2. Increased arousal during REM sleep Index 22.9/hr 3. Moderate snoring   DIAGNOSIS: Obstructive sleep apnea (adult) (pediatric) / G47.33  RECOMMENDATIONS: 1. Therapy with CPAP indicated: inlab titration or AutoPAP trial Sleep hygiene

## 2022-12-12 PROCEDURE — 95810 POLYSOM 6/> YRS 4/> PARAM: CPT | Mod: 26,,, | Performed by: INTERNAL MEDICINE

## 2022-12-12 PROCEDURE — 95810 PR POLYSOMNOGRAPHY, 4 OR MORE: ICD-10-PCS | Mod: 26,,, | Performed by: INTERNAL MEDICINE

## 2022-12-12 NOTE — PROCEDURES
"CONCLUSION:  Mild obstructive sleep apnea; AHI 5.8/hr with 38 events, REM AHI was 17.1/hr  Increased arousal during REM sleep Index 22.9/hr  Moderate snoring      DIAGNOSIS: Obstructive sleep apnea (adult) (pediatric) / G47.33    RECOMMENDATIONS:  Therapy with CPAP indicated: inlab titration or AutoPAP trial  Sleep hygiene      See imported Sleep Study result in "Chart Review" under the   "Media tab".      (This Sleep Study was interpreted by a Board Certified Sleep  Specialist who conducted an epoch-by-epoch review of the entire  raw data recording.)     (The indication for this sleep study was reviewed and deemed  appropriate by [facility] Practice Parameters or other reasons by a  Board Certified Sleep Specialist.)      The sleep study that you ordered is complete.  You have ordered sleep LAB services to perform the sleep study forNAME@       You can look  for the report in the  Media as a procedure document type.    As the ordering provider, you are responsible for reviewing the results and implementing a treatment plan with your patient.     If you need a Sleep Medicine provider to explain the sleep study findings and arrange treatment for the patient, please refer patient for consultation to our Sleep Clinic via [facility] with Ambulatory Consult Sleep.     To do that please place an order for an  "Ambulatory Consult Sleep" - it will go to our clinic work queue for our Medical Assistant to contact the patient for an appointment.     For any questions, please contact our clinic staff at  to talk to clinical staff or pulmonary nurse navigator    Trell Dave MD    "

## 2022-12-27 ENCOUNTER — CLINICAL SUPPORT (OUTPATIENT)
Dept: SMOKING CESSATION | Facility: CLINIC | Age: 47
End: 2022-12-27

## 2022-12-27 DIAGNOSIS — F17.200 NICOTINE DEPENDENCE: Primary | ICD-10-CM

## 2022-12-27 PROCEDURE — 99403 PREV MED CNSL INDIV APPRX 45: CPT | Mod: S$GLB,,, | Performed by: SPEECH-LANGUAGE PATHOLOGIST

## 2022-12-27 PROCEDURE — 99999 PR PBB SHADOW E&M-EST. PATIENT-LVL I: CPT | Mod: PBBFAC,,, | Performed by: SPEECH-LANGUAGE PATHOLOGIST

## 2022-12-27 PROCEDURE — 99999 PR PBB SHADOW E&M-EST. PATIENT-LVL I: ICD-10-PCS | Mod: PBBFAC,,, | Performed by: SPEECH-LANGUAGE PATHOLOGIST

## 2022-12-27 PROCEDURE — 99403 PR PREVENT COUNSEL,INDIV,45 MIN: ICD-10-PCS | Mod: S$GLB,,, | Performed by: SPEECH-LANGUAGE PATHOLOGIST

## 2022-12-27 RX ORDER — NICOTINE 7MG/24HR
1 PATCH, TRANSDERMAL 24 HOURS TRANSDERMAL DAILY
Qty: 28 PATCH | Refills: 0 | Status: SHIPPED | OUTPATIENT
Start: 2022-12-27 | End: 2023-07-21

## 2022-12-27 RX ORDER — DM/P-EPHED/ACETAMINOPH/DOXYLAM 30-7.5/3
2 LIQUID (ML) ORAL
Qty: 270 LOZENGE | Refills: 0 | Status: SHIPPED | OUTPATIENT
Start: 2022-12-27 | End: 2023-07-21

## 2022-12-27 NOTE — PROGRESS NOTES
Individual Follow-Up Form    12/27/2022    Quit Date: 11/11/2022    Clinical Status of Patient: Outpatient    Continuing Medication: yes  Patches reports using 7 mg & that she's been using her 2 mg lozenges samples, Wellbutrin    Other Medications:      Target Symptoms: Withdrawal and medication side effects. The following were  rated moderate (3) to severe (4) on TCRS:  Moderate (3): none  Severe (4): none    Comments: Telephone visit at the patient's request. Patient reports she met her goal of remaining smoke free & is using her 2 mg lozenges. Patient reports she has remains smoke free since her quit date 11/11/2022. Praised patient on her continued commitment on living her smoke free life. She remains on her NRT of Welbutrin & is using the 2 mg lozenges around 2 per day which is a decrease from the 10 4 mg lozenges that she was using at last session.  She reports she started using 7 mg patches about 5 days ago that she bought over the counter. Administered TCRS with the patient reporting no symptoms. She reports her knee pain has gone away & that she isn't snacking like she used to. Discussed high risk situations to include  stress. Patient reports she's been dealing with stress if she experiences it. She reports she is happy with her progress & her health improvements reporting she passed her stress test & that she was cleared by her pulmonologist. She reports she does have sleep apnea & has to get the machine for it. Patient states her goal for next session is to remain smoke free & not be utilizing her patches. Follow up set for 1/23/2023 at 1:00 PM.     Diagnosis: F17.200    Next Visit: 4 weeks

## 2023-01-23 ENCOUNTER — TELEPHONE (OUTPATIENT)
Dept: SMOKING CESSATION | Facility: CLINIC | Age: 48
End: 2023-01-23
Payer: MEDICARE

## 2023-01-23 ENCOUNTER — CLINICAL SUPPORT (OUTPATIENT)
Dept: SMOKING CESSATION | Facility: CLINIC | Age: 48
End: 2023-01-23

## 2023-01-23 DIAGNOSIS — F17.200 NICOTINE DEPENDENCE: Primary | ICD-10-CM

## 2023-01-23 PROCEDURE — 99402 PR PREVENT COUNSEL,INDIV,30 MIN: ICD-10-PCS | Mod: S$GLB,,, | Performed by: SPEECH-LANGUAGE PATHOLOGIST

## 2023-01-23 PROCEDURE — 99402 PREV MED CNSL INDIV APPRX 30: CPT | Mod: S$GLB,,, | Performed by: SPEECH-LANGUAGE PATHOLOGIST

## 2023-01-23 PROCEDURE — 99999 PR PBB SHADOW E&M-EST. PATIENT-LVL I: CPT | Mod: PBBFAC,,, | Performed by: SPEECH-LANGUAGE PATHOLOGIST

## 2023-01-23 PROCEDURE — 99999 PR PBB SHADOW E&M-EST. PATIENT-LVL I: ICD-10-PCS | Mod: PBBFAC,,, | Performed by: SPEECH-LANGUAGE PATHOLOGIST

## 2023-01-23 RX ORDER — BUPROPION HYDROCHLORIDE 150 MG/1
TABLET, EXTENDED RELEASE ORAL
Qty: 60 TABLET | Refills: 0 | Status: SHIPPED | OUTPATIENT
Start: 2023-01-23 | End: 2023-02-23

## 2023-01-23 NOTE — PROGRESS NOTES
Individual Follow-Up Form    1/23/2023    Quit Date: 11/11/2022    Clinical Status of Patient: Outpatient    Continuing Medication: yes  Nicotine Lozenges 2 mg, Wellbutrin    Other Medications:      Target Symptoms: Withdrawal and medication side effects. The following were  rated moderate (3) to severe (4) on TCRS:  Moderate (3): none  Severe (4): none    Comments: Telephone visit due to the patient is traveling. Patient reports she has remained smoke free since her quit date & not using her patches. She reports she is using her 2 mg lozenges around 3-4 lozenges. Praised patient on her continued commitment on to living her smoke free life. She remains on her NRT of Welbutrin & is using the 2 mg lozenges. She reports she is not utilizing her patches. She reports an episode of taking a drag off of a friend's cigarette due to stress around the time she stopped using her patches. Discussed s/s of nicotine withdrawal that the patient may have been experiencing at that time. Explored alternative ways to manage stress to include mindfulness & breathing. Discussed usage of guided mediatation & mindfulness apps on her smart phone. Patient states she is willing to try. Patient states her goal by next session is to cut back on using the number of lozenges. Follow up set for 2/1/2023 at 5:00 pm.     Diagnosis: F17.200    Next Visit: 1 week

## 2023-01-23 NOTE — TELEPHONE ENCOUNTER
Patient text to say she is taking a friend to [place] & may not be available for her 1:00 pm appointment today. Scheduled an appointment for 12 pm on 2/1/2023

## 2023-01-23 NOTE — TELEPHONE ENCOUNTER
Patient states her current location is loud due being around other people. She is agreeable to have CTTS touch base with her at a later time today if we aren't able to speak in the next hour due to her location

## 2023-02-01 ENCOUNTER — TELEPHONE (OUTPATIENT)
Dept: SMOKING CESSATION | Facility: CLINIC | Age: 48
End: 2023-02-01
Payer: MEDICARE

## 2023-02-01 NOTE — TELEPHONE ENCOUNTER
Patient text to say she needs to reschedule today due to she is working with her son & isn't done by now to do her scheduled 12 pm appointment

## 2023-02-01 NOTE — TELEPHONE ENCOUNTER
Text sent to patient to see if she was available for a phone session since she was not available for her scheduled 12 pm appointment

## 2023-02-09 ENCOUNTER — CLINICAL SUPPORT (OUTPATIENT)
Dept: SMOKING CESSATION | Facility: CLINIC | Age: 48
End: 2023-02-09

## 2023-02-09 DIAGNOSIS — F17.200 NICOTINE DEPENDENCE: Primary | ICD-10-CM

## 2023-02-09 PROCEDURE — 99407 BEHAV CHNG SMOKING > 10 MIN: CPT | Mod: S$GLB,,,

## 2023-02-09 PROCEDURE — 99407 PR TOBACCO USE CESSATION INTENSIVE >10 MINUTES: ICD-10-PCS | Mod: S$GLB,,,

## 2023-02-09 NOTE — PROGRESS NOTES
Spoke with patient today in regard to smoking cessation progress for 3 month telephone follow up, she states tobacco free. Commended patient on the accomplishment thus far. Informed patient of benefit period, future follow ups, and contact information if any further help or support is needed. Will complete smart form for 3 month follow up on Quit attempt #1.

## 2023-05-04 ENCOUNTER — CLINICAL SUPPORT (OUTPATIENT)
Dept: SMOKING CESSATION | Facility: CLINIC | Age: 48
End: 2023-05-04

## 2023-05-04 DIAGNOSIS — F17.200 NICOTINE DEPENDENCE: Primary | ICD-10-CM

## 2023-05-04 PROCEDURE — 99407 BEHAV CHNG SMOKING > 10 MIN: CPT | Mod: S$GLB,,,

## 2023-05-04 PROCEDURE — 99407 PR TOBACCO USE CESSATION INTENSIVE >10 MINUTES: ICD-10-PCS | Mod: S$GLB,,,

## 2023-05-04 NOTE — PROGRESS NOTES
Spoke with patient today in regard to smoking cessation progress for 6-month telephone follow up. Patient states that she is tobacco free. Commended patient on their quit.  Patient continues on 150 mg Wellbutrin QAM for smoking cessation and plans to discontinue.  Informed patient of benefit period, future follow up, and contact information if any further help or support is needed.  Will complete smart form for 6 month follow up on Quit attempt #1.

## 2023-11-09 ENCOUNTER — CLINICAL SUPPORT (OUTPATIENT)
Dept: SMOKING CESSATION | Facility: CLINIC | Age: 48
End: 2023-11-09

## 2023-11-09 DIAGNOSIS — F17.200 NICOTINE DEPENDENCE: Primary | ICD-10-CM

## 2023-11-09 PROCEDURE — 99407 PR TOBACCO USE CESSATION INTENSIVE >10 MINUTES: ICD-10-PCS | Mod: S$GLB,,, | Performed by: GENERAL PRACTICE

## 2023-11-09 PROCEDURE — 99407 BEHAV CHNG SMOKING > 10 MIN: CPT | Mod: S$GLB,,, | Performed by: GENERAL PRACTICE

## 2023-11-09 NOTE — PROGRESS NOTES
Spoke with patient today in regard to smoking cessation progress 12 month telephone follow up, she states that she is tobacco free.  Commended patient on the accomplishments thus far.  Informed patient of benefit period, future follow up, and contact information if any further help or support is needed.  Will complete smart form for 12 month follow up on Quit attempt #1 and resolve episode.